# Patient Record
Sex: MALE | Race: WHITE | Employment: FULL TIME | URBAN - METROPOLITAN AREA
[De-identification: names, ages, dates, MRNs, and addresses within clinical notes are randomized per-mention and may not be internally consistent; named-entity substitution may affect disease eponyms.]

---

## 2021-01-04 ENCOUNTER — HOSPITAL ENCOUNTER (EMERGENCY)
Age: 51
Discharge: HOME | End: 2021-01-05
Attending: STUDENT IN AN ORGANIZED HEALTH CARE EDUCATION/TRAINING PROGRAM
Payer: COMMERCIAL

## 2021-01-04 DIAGNOSIS — S05.01XA ABRASION OF RIGHT CORNEA, INITIAL ENCOUNTER: Primary | ICD-10-CM

## 2021-01-04 PROCEDURE — 99283 EMERGENCY DEPT VISIT LOW MDM: CPT

## 2021-01-04 PROCEDURE — 74011000250 HC RX REV CODE- 250: Performed by: STUDENT IN AN ORGANIZED HEALTH CARE EDUCATION/TRAINING PROGRAM

## 2021-01-04 RX ORDER — OMEPRAZOLE 10 MG/1
10 CAPSULE, DELAYED RELEASE ORAL DAILY
COMMUNITY

## 2021-01-04 RX ORDER — TETRACAINE HYDROCHLORIDE 5 MG/ML
1 SOLUTION OPHTHALMIC
Status: COMPLETED | OUTPATIENT
Start: 2021-01-05 | End: 2021-01-04

## 2021-01-04 RX ORDER — LISINOPRIL 10 MG/1
10 TABLET ORAL DAILY
COMMUNITY

## 2021-01-04 RX ADMIN — FLUORESCEIN SODIUM 1 STRIP: 1 STRIP OPHTHALMIC at 23:20

## 2021-01-04 RX ADMIN — TETRACAINE HYDROCHLORIDE 1 DROP: 5 SOLUTION OPHTHALMIC at 23:20

## 2021-01-05 VITALS
HEART RATE: 75 BPM | RESPIRATION RATE: 18 BRPM | WEIGHT: 222.66 LBS | SYSTOLIC BLOOD PRESSURE: 168 MMHG | TEMPERATURE: 98.7 F | DIASTOLIC BLOOD PRESSURE: 103 MMHG | OXYGEN SATURATION: 96 %

## 2021-01-05 RX ORDER — CYCLOPENTOLATE HYDROCHLORIDE 10 MG/ML
1 SOLUTION/ DROPS OPHTHALMIC 3 TIMES DAILY
Qty: 5 ML | Refills: 0 | Status: SHIPPED | OUTPATIENT
Start: 2021-01-05 | End: 2021-01-08

## 2021-01-05 RX ORDER — ERYTHROMYCIN 5 MG/G
OINTMENT OPHTHALMIC
Qty: 1 TUBE | Refills: 2 | Status: SHIPPED | OUTPATIENT
Start: 2021-01-05

## 2021-01-05 RX ORDER — OXYCODONE AND ACETAMINOPHEN 5; 325 MG/1; MG/1
1 TABLET ORAL
Qty: 12 TAB | Refills: 0 | Status: SHIPPED | OUTPATIENT
Start: 2021-01-05 | End: 2021-01-08

## 2021-01-05 NOTE — ED NOTES
Pt ambulatory out of ED with discharge instructions and prescriptions in hand given by Dr. Riccardo Stoddard; pt verbalized understanding of discharge paperwork and time allotted for questions. VSS. Pt alert and oriented.

## 2021-01-05 NOTE — ED TRIAGE NOTES
Pt reports his dog scratched pts right  eye with his paw. Happened this evening. +blurred vision, eyelid appears swollen    Tested positive covid on 1/1/21. Denies symptoms related to covid at this time.

## 2021-01-06 NOTE — ED PROVIDER NOTES
Patient presenting with constant severe pain over his right eye after his dog inadvertently scratched his eye. States the dog was attempting to crawl to his lap and he told his head back. He also recently also COVID-19 but was feeling \"unwell\" but did not have it as of his symptoms and fatigue. He has had blurriness in the right eye. He however has not had visual field deficit, bleeding or lacerations or other injuries. No scotoma.+ Foreign body sensation           Past Medical History:   Diagnosis Date    GERD (gastroesophageal reflux disease)     Hypertension        History reviewed. No pertinent surgical history. History reviewed. No pertinent family history. Social History     Socioeconomic History    Marital status:      Spouse name: Not on file    Number of children: Not on file    Years of education: Not on file    Highest education level: Not on file   Occupational History    Not on file   Social Needs    Financial resource strain: Not on file    Food insecurity     Worry: Not on file     Inability: Not on file    Transportation needs     Medical: Not on file     Non-medical: Not on file   Tobacco Use    Smoking status: Never Smoker    Smokeless tobacco: Never Used   Substance and Sexual Activity    Alcohol use:  Yes    Drug use: Not Currently    Sexual activity: Not on file   Lifestyle    Physical activity     Days per week: Not on file     Minutes per session: Not on file    Stress: Not on file   Relationships    Social connections     Talks on phone: Not on file     Gets together: Not on file     Attends Synagogue service: Not on file     Active member of club or organization: Not on file     Attends meetings of clubs or organizations: Not on file     Relationship status: Not on file    Intimate partner violence     Fear of current or ex partner: Not on file     Emotionally abused: Not on file     Physically abused: Not on file     Forced sexual activity: Not on file Other Topics Concern    Not on file   Social History Narrative    Not on file         ALLERGIES: Patient has no known allergies. Review of Systems   Constitutional: Negative for chills, fatigue and fever. HENT: Negative for ear pain, sore throat and trouble swallowing. Eyes: Negative for visual disturbance. Respiratory: Negative for cough and shortness of breath. Cardiovascular: Negative for chest pain. Gastrointestinal: Negative for abdominal pain. Genitourinary: Negative for dysuria. Musculoskeletal: Negative for back pain. Skin: Negative for rash. Neurological: Negative for light-headedness and headaches. Psychiatric/Behavioral: Negative for confusion. All other systems reviewed and are negative. Vitals:    01/04/21 2300 01/04/21 2301 01/04/21 2302 01/05/21 0016   BP: (!) 177/114   (!) 168/103   Pulse: 75      Resp: 18  18    Temp: 98.7 °F (37.1 °C)  98.7 °F (37.1 °C)    SpO2: 96%      Weight: 135.7 kg (299 lb 2.6 oz) 101 kg (222 lb 10.6 oz)              Physical Exam  Vitals signs reviewed. Constitutional:       General: He is not in acute distress. HENT:      Head: Normocephalic and atraumatic. Mouth/Throat:      Mouth: Mucous membranes are moist.      Pharynx: Oropharynx is clear. Eyes:      Extraocular Movements: Extraocular movements intact. Pupils: Pupils are equal, round, and reactive to light. Comments: Right eye with a large corneal abrasion approximately 25% partially through the visual axis. There is no Enedina sign. Mild conjunctival injection. Cardiovascular:      Rate and Rhythm: Normal rate and regular rhythm. Heart sounds: Normal heart sounds. Pulmonary:      Effort: Pulmonary effort is normal.      Breath sounds: Normal breath sounds. Abdominal:      Tenderness: There is no abdominal tenderness. There is no guarding or rebound. Musculoskeletal: Normal range of motion. Skin:     General: Skin is warm and dry.       Capillary Refill: Capillary refill takes less than 2 seconds. Neurological:      General: No focal deficit present. Mental Status: He is alert and oriented to person, place, and time. Psychiatric:         Mood and Affect: Mood normal.          MDM  Number of Diagnoses or Management Options  Abrasion of right cornea, initial encounter  Diagnosis management comments: Given the size of the corneal abrasion he was prescribed cycloplegics, follow-up with his eye care provider in [place] town. Will return if worse.          Procedures

## 2022-08-30 ENCOUNTER — OFFICE VISIT (OUTPATIENT)
Dept: ORTHOPEDIC SURGERY | Age: 52
End: 2022-08-30
Payer: COMMERCIAL

## 2022-08-30 VITALS — WEIGHT: 205 LBS | BODY MASS INDEX: 30.36 KG/M2 | HEIGHT: 69 IN

## 2022-08-30 DIAGNOSIS — S83.282A TEAR OF LATERAL MENISCUS OF LEFT KNEE, UNSPECIFIED TEAR TYPE, UNSPECIFIED WHETHER OLD OR CURRENT TEAR, INITIAL ENCOUNTER: ICD-10-CM

## 2022-08-30 DIAGNOSIS — M25.562 CHRONIC PAIN OF LEFT KNEE: ICD-10-CM

## 2022-08-30 DIAGNOSIS — G89.29 CHRONIC PAIN OF LEFT KNEE: ICD-10-CM

## 2022-08-30 DIAGNOSIS — M25.562 LEFT LATERAL KNEE PAIN: ICD-10-CM

## 2022-08-30 DIAGNOSIS — M25.562 ACUTE PAIN OF LEFT KNEE: Primary | ICD-10-CM

## 2022-08-30 DIAGNOSIS — M25.562 KNEE MENISCUS PAIN, LEFT: ICD-10-CM

## 2022-08-30 PROCEDURE — 99204 OFFICE O/P NEW MOD 45 MIN: CPT | Performed by: ORTHOPAEDIC SURGERY

## 2022-08-30 PROCEDURE — 20610 DRAIN/INJ JOINT/BURSA W/O US: CPT | Performed by: ORTHOPAEDIC SURGERY

## 2022-08-30 RX ORDER — TRIAMCINOLONE ACETONIDE 40 MG/ML
80 INJECTION, SUSPENSION INTRA-ARTICULAR; INTRAMUSCULAR ONCE
Status: COMPLETED | OUTPATIENT
Start: 2022-08-30 | End: 2022-08-30

## 2022-08-30 RX ORDER — BUPIVACAINE HYDROCHLORIDE 7.5 MG/ML
2 INJECTION, SOLUTION EPIDURAL; RETROBULBAR ONCE
Status: COMPLETED | OUTPATIENT
Start: 2022-08-30 | End: 2022-08-30

## 2022-08-30 RX ADMIN — TRIAMCINOLONE ACETONIDE 80 MG: 40 INJECTION, SUSPENSION INTRA-ARTICULAR; INTRAMUSCULAR at 13:33

## 2022-08-30 RX ADMIN — BUPIVACAINE HYDROCHLORIDE 15 MG: 7.5 INJECTION, SOLUTION EPIDURAL; RETROBULBAR at 13:33

## 2022-08-30 NOTE — PROGRESS NOTES
Arlette Brand (: 1970) is a 47 y.o. male, patient, here for evaluation of the following chief complaint(s):  Knee Pain (Left knee pain x 1 month)       HPI:    He began having increased left knee pain 10 years ago. The patient states that he was originally injured playing football in high school. More recently over the last month, the patient reports increased discomfort. He reports no specific injury but describes his left knee pain as severe to extremely severe, throbbing, aching, and intermittent. He has been experiencing some weakness in his left knee. Over the last several weeks, the patient states that his pain was unchanged. He reports that standing, walking, and kneeling makes pain worse and he denies makes pain better. The patient has been taking ibuprofen for his discomfort as needed. He has been wearing a knee brace for comfort, stability, and support. The patient was not seen in the emergency room for his left knee pain and reports no previous or related left knee surgery. No Known Allergies    Current Outpatient Medications   Medication Sig    erythromycin (ILOTYCIN) ophthalmic ointment Instill  A 1 cm ribbon into affected eye(s) twice daily and at bedtime    lisinopriL (PRINIVIL, ZESTRIL) 10 mg tablet Take 10 mg by mouth daily. omeprazole (PRILOSEC) 10 mg capsule Take 10 mg by mouth daily. ~unsure of dose     No current facility-administered medications for this visit. Past Medical History:   Diagnosis Date    GERD (gastroesophageal reflux disease)     Hypertension         History reviewed. No pertinent surgical history. History reviewed. No pertinent family history.      Social History     Socioeconomic History    Marital status:      Spouse name: Not on file    Number of children: Not on file    Years of education: Not on file    Highest education level: Not on file   Occupational History    Not on file   Tobacco Use    Smoking status: Never    Smokeless tobacco: Never   Substance and Sexual Activity    Alcohol use: Yes    Drug use: Not Currently    Sexual activity: Not on file   Other Topics Concern    Not on file   Social History Narrative    Not on file     Social Determinants of Health     Financial Resource Strain: Not on file   Food Insecurity: Not on file   Transportation Needs: Not on file   Physical Activity: Not on file   Stress: Not on file   Social Connections: Not on file   Intimate Partner Violence: Not on file   Housing Stability: Not on file       Review of Systems   All other systems reviewed and are negative. Vitals:  Ht 5' 9\" (1.753 m)   Wt 205 lb (93 kg)   BMI 30.27 kg/m²    Body mass index is 30.27 kg/m². Ortho Exam     The patient is well-developed and well-nourished. The patient presents today in alert and oriented x3 with a normal mood and affect. The patient stands with a normal weightbearing line but walks with a slightly antalgic gait because of his left knee pain. Left knee is tender to palpation along the lateral joint line and has a a small effusion. The patient has positive Ayaz´s test and the knee is stable. There is a lack full flexion secondary to the effusion but does have full extension. There is 5/5. The knee and lower extremity is neurovascularly intact. There is no erythema or warmth the skin is normal.    ASSESSMENT/PLAN:      1. Acute pain of left knee  2. Chronic pain of left knee  -     XR KNEE LT MIN 4 V; Future  3. Tear of lateral meniscus of left knee, unspecified tear type, unspecified whether old or current tear, initial encounter  -     MRI KNEE LT WO CONT; Future  4. Left lateral knee pain  -     bupivacaine (PF) (MARCAINE) 0.75 % (7.5 mg/mL) injection 15 mg; 15 mg (2 mL), Intra artICUlar, ONCE, 1 dose, On Tue 8/30/22 at 1400  -     triamcinolone acetonide (KENALOG-40) 40 mg/mL injection 80 mg; 80 mg, Intra artICUlar, ONCE, 1 dose, On Tue 8/30/22 at 1400  5.  Knee meniscus pain, left     XR Results (most recent):  Results from Appointment encounter on 08/30/22    XR KNEE LT MIN 4 V    Narrative  Left knee 4 view x-ray show no evidence of a fracture or dislocation. Evidence of moderate lateral compartment osteoarthritis/joint space narrowing. Below is the assessment and plan developed based on review of pertinent history, physical exam, labs, studies, and medications. We discussed the patient's ongoing and now significant/extremely severe left knee pain. His signs, symptoms, physical exam, description of his pain, and x-rays are consistent with a lateral meniscus tear and mild to moderate lateral compartment osteoarthritis. The possible treatment options were discussed with the patient and we elected to inject his left knee with cortisone today to try and alleviate some of his discomfort. The risks and benefits of the injection were discussed in detail with the patient and under sterile prep the patient's left knee was injected with 2 ccs of 40 mg/cc of Kenalog and 2 ccs of 0.75% Sensorcaine. He tolerated the injection well. Also because of the duration of his increased pain, no improvement with multiple modalities of conservative management including an at-home exercise program, his physical exam, description of his pain, description of his previous football injury, x-rays, and his inability to complete daily living activities without significant discomfort we elected to obtain an MRI of his left knee to further evaluate the severity of his lateral meniscus tear and lateral compartment osteoarthritis. The MRI images and results will be used in preoperative planning if and almost certainly when surgical intervention is necessary. The risks and benefits of the MRI were discussed in detail with the patient and he would like to proceed. We will schedule at his convenience. I will see him back after his MRI is complete to discuss the images, results, and further treatment options.   In the interim, I did encourage him to ice and elevate when possible, modify his activity level based on his left knee pain, and use anti-inflammatory medication when necessary. The patient also work on range of motion, strengthening, and stretching exercises with an at-home exercise program as pain tolerates. He is to avoid any deep knee bend activities against resistance, squatting, kneeling, stairs, lunging, cutting, twisting, change of direction, and high impact loading activities. I will see him back as noted above after his left knee MRI is complete. **We will obtain an MRI for more information to determine the best treatment plan moving forward and help us prepare for surgical intervention if necessary. **    Return for After his left knee MRI is complete. An electronic signature was used to authenticate this note.   -- Adamaris Sahni MD

## 2022-08-30 NOTE — LETTER
8/30/2022 1:11 PM    Mr. Riri Ashby  [id number] [facility] [id number]        To whom to may concern:     Riri Ashby was seen in our office on 8/30/2022. Status and/or Restrictions    He  may return to work beginning 9/6/2022    With the following restrictions:   Avoid squatting, kneeling, and climbing    Feel free to contact my office at [phone] if you have any questions.          Sincerely,          Coty Aviles MD

## 2022-09-27 ENCOUNTER — OFFICE VISIT (OUTPATIENT)
Dept: ORTHOPEDIC SURGERY | Age: 52
End: 2022-09-27
Payer: COMMERCIAL

## 2022-09-27 VITALS — HEIGHT: 69 IN | BODY MASS INDEX: 30.36 KG/M2 | WEIGHT: 205 LBS

## 2022-09-27 DIAGNOSIS — M25.562 LEFT LATERAL KNEE PAIN: ICD-10-CM

## 2022-09-27 DIAGNOSIS — G89.29 CHRONIC PAIN OF LEFT KNEE: Primary | ICD-10-CM

## 2022-09-27 DIAGNOSIS — M25.562 KNEE MENISCUS PAIN, LEFT: ICD-10-CM

## 2022-09-27 DIAGNOSIS — M25.561 MEDIAL KNEE PAIN, RIGHT: ICD-10-CM

## 2022-09-27 DIAGNOSIS — M17.12 PRIMARY LOCALIZED OSTEOARTHRITIS OF LEFT KNEE: ICD-10-CM

## 2022-09-27 DIAGNOSIS — S83.242D TEAR OF MEDIAL MENISCUS OF LEFT KNEE, SUBSEQUENT ENCOUNTER: ICD-10-CM

## 2022-09-27 DIAGNOSIS — S83.282D TEAR OF LATERAL MENISCUS OF LEFT KNEE, SUBSEQUENT ENCOUNTER: ICD-10-CM

## 2022-09-27 DIAGNOSIS — M25.562 CHRONIC PAIN OF LEFT KNEE: Primary | ICD-10-CM

## 2022-09-27 PROCEDURE — 99213 OFFICE O/P EST LOW 20 MIN: CPT | Performed by: ORTHOPAEDIC SURGERY

## 2022-09-27 NOTE — PROGRESS NOTES
Viral Virgen (: 1970) is a 47 y.o. male, patient, here for evaluation of the following chief complaint(s):  Knee Pain (Left knee, mri results)       HPI:    He was last seen for his left knee pain on 2022. Since then, the patient did have an MRI performed on his left knee on 2022. The patient states that his pain level has improved since his last visit. He rates the severity of his left knee pain as a 1 out of 10. He gives no detail or description of his discomfort. He is not taking any medication for his discomfort. The patient did receive a cortisone injection at his last visit in his left knee and states that the injection did help reduce a significant amount of his left knee pain. Left knee MRI images and results were independently reviewed and they were consistent with complex degenerative tearing of the anterior horn and body of the lateral meniscus with partial extrusion. Moderate lateral compartment osteoarthritis. Popliteus tendinosis. Undersurface tearing in the posterior horn the medial meniscus. Mild knee joint effusion. No Known Allergies    Current Outpatient Medications   Medication Sig    erythromycin (ILOTYCIN) ophthalmic ointment Instill  A 1 cm ribbon into affected eye(s) twice daily and at bedtime    lisinopriL (PRINIVIL, ZESTRIL) 10 mg tablet Take 10 mg by mouth daily. omeprazole (PRILOSEC) 10 mg capsule Take 10 mg by mouth daily. ~unsure of dose     No current facility-administered medications for this visit. Past Medical History:   Diagnosis Date    GERD (gastroesophageal reflux disease)     Hypertension         History reviewed. No pertinent surgical history. History reviewed. No pertinent family history.      Social History     Socioeconomic History    Marital status:      Spouse name: Not on file    Number of children: Not on file    Years of education: Not on file    Highest education level: Not on file   Occupational History    Not on file   Tobacco Use    Smoking status: Never    Smokeless tobacco: Never   Substance and Sexual Activity    Alcohol use: Yes    Drug use: Not Currently    Sexual activity: Not on file   Other Topics Concern    Not on file   Social History Narrative    Not on file     Social Determinants of Health     Financial Resource Strain: Not on file   Food Insecurity: Not on file   Transportation Needs: Not on file   Physical Activity: Not on file   Stress: Not on file   Social Connections: Not on file   Intimate Partner Violence: Not on file   Housing Stability: Not on file       Review of Systems   All other systems reviewed and are negative. Vitals:  Ht 5' 9\" (1.753 m)   Wt 205 lb (93 kg)   BMI 30.27 kg/m²    Body mass index is 30.27 kg/m². Ortho Exam     The patient is well-developed and well-nourished. The patient presents today in alert and oriented x3 with a normal mood and affect. The patient stands with a normal weightbearing line but walks with a slightly antalgic gait because of his left knee pain. Left knee is tender to palpation along the medial and lateral joint line and has a a small effusion. The patient has positive Ayaz´s test and the knee is stable. There is a lack full flexion secondary to the effusion but does have full extension. There is 5/5. The knee and lower extremity is neurovascularly intact. There is no erythema or warmth the skin is normal.    ASSESSMENT/PLAN:      1. Chronic pain of left knee  2. Left lateral knee pain  3. Knee meniscus pain, left  4. Primary localized osteoarthritis of left knee  5. Tear of lateral meniscus of left knee, subsequent encounter  6. Tear of medial meniscus of left knee, subsequent encounter  7. Medial knee pain, right     Below is the assessment and plan developed based on review of pertinent history, physical exam, labs, studies, and medications.     We discussed the patient's ongoing but improving left knee pain and we independently reviewed his MRI images and results and they were consistent with complex degenerative tearing of the anterior horn and body of the lateral meniscus with partial extrusion. Moderate lateral compartment osteoarthritis. Popliteus tendinosis. Undersurface tearing in the posterior horn the medial meniscus. Mild knee joint effusion. The possible treatment options were discussed with the patient and because his pain has improved since his last visit and cortisone injection we will treat his discomfort at this point with rest, ice, elevation, activity modification, and anti-inflammatory medication. The patient will work on range of motion, strengthening, and stretching exercises with an at-home exercise program as pain tolerates. He is to avoid any deep knee bend activities against resistance, squatting, kneeling, stairs, lunging, cutting, twisting, change of direction, and high impact loading activities. I will see him back in 4 weeks for reevaluation if he has continued persistence of his pain however, his pain continues to improve and is well-maintained I will see him back on an as-needed basis at which point we would discuss alternative treatment options. Return in about 4 weeks (around 10/25/2022), or if symptoms worsen or fail to improve. An electronic signature was used to authenticate this note.   -- Coty Aviles MD